# Patient Record
Sex: MALE | Race: WHITE | Employment: OTHER | URBAN - METROPOLITAN AREA
[De-identification: names, ages, dates, MRNs, and addresses within clinical notes are randomized per-mention and may not be internally consistent; named-entity substitution may affect disease eponyms.]

---

## 2017-02-07 ENCOUNTER — OFFICE VISIT (OUTPATIENT)
Dept: FAMILY MEDICINE CLINIC | Facility: CLINIC | Age: 67
End: 2017-02-07

## 2017-02-07 VITALS
SYSTOLIC BLOOD PRESSURE: 164 MMHG | DIASTOLIC BLOOD PRESSURE: 84 MMHG | OXYGEN SATURATION: 96 % | WEIGHT: 204 LBS | RESPIRATION RATE: 14 BRPM | HEART RATE: 78 BPM | HEIGHT: 73.5 IN | BODY MASS INDEX: 26.46 KG/M2 | TEMPERATURE: 99 F

## 2017-02-07 DIAGNOSIS — Z23 NEED FOR TDAP VACCINATION: ICD-10-CM

## 2017-02-07 DIAGNOSIS — Z23 NEED FOR SHINGLES VACCINE: ICD-10-CM

## 2017-02-07 DIAGNOSIS — Z13.29 SCREENING FOR ENDOCRINE, NUTRITIONAL, METABOLIC AND IMMUNITY DISORDER: ICD-10-CM

## 2017-02-07 DIAGNOSIS — Z13.228 SCREENING FOR ENDOCRINE, NUTRITIONAL, METABOLIC AND IMMUNITY DISORDER: ICD-10-CM

## 2017-02-07 DIAGNOSIS — I83.92 VARICOSE VEINS OF LEFT LOWER EXTREMITY: ICD-10-CM

## 2017-02-07 DIAGNOSIS — R03.0 BLOOD PRESSURE ELEVATED WITHOUT HISTORY OF HTN: ICD-10-CM

## 2017-02-07 DIAGNOSIS — Z13.21 SCREENING FOR ENDOCRINE, NUTRITIONAL, METABOLIC AND IMMUNITY DISORDER: ICD-10-CM

## 2017-02-07 DIAGNOSIS — Z00.00 MEDICARE WELCOME VISIT: Primary | ICD-10-CM

## 2017-02-07 DIAGNOSIS — E66.3 OVERWEIGHT (BMI 25.0-29.9): ICD-10-CM

## 2017-02-07 DIAGNOSIS — Z12.5 PROSTATE CANCER SCREENING: ICD-10-CM

## 2017-02-07 DIAGNOSIS — Z13.0 SCREENING FOR ENDOCRINE, NUTRITIONAL, METABOLIC AND IMMUNITY DISORDER: ICD-10-CM

## 2017-02-07 DIAGNOSIS — Z23 NEED FOR PNEUMOCOCCAL VACCINATION: ICD-10-CM

## 2017-02-07 DIAGNOSIS — M79.672 LEFT FOOT PAIN: ICD-10-CM

## 2017-02-07 DIAGNOSIS — Z12.11 SCREENING FOR COLON CANCER: ICD-10-CM

## 2017-02-07 PROCEDURE — G0009 ADMIN PNEUMOCOCCAL VACCINE: HCPCS | Performed by: EMERGENCY MEDICINE

## 2017-02-07 PROCEDURE — 90732 PPSV23 VACC 2 YRS+ SUBQ/IM: CPT | Performed by: EMERGENCY MEDICINE

## 2017-02-07 PROCEDURE — G0402 INITIAL PREVENTIVE EXAM: HCPCS | Performed by: EMERGENCY MEDICINE

## 2017-02-07 NOTE — PROGRESS NOTES
HPI:   Isaias Cheney is a 73year old male who presents for a Medicare Initial Preventative Physical Exam (Welcome to Medicare- < 12 months on Medicare).       His last annual assessment has been over 1 year: Annual Physical due on 06/02/1952    NO m SYSTEMS:   GENERAL: feels well otherwise  SKIN: denies any unusual skin lesions  EYES: denies blurred vision or double vision  HEENT: denies nasal congestion, sinus pain or ST  LUNGS: denies shortness of breath with exertion  CARDIOVASCULAR: denies chest p deformity   Heart:  Regular rate and rhythm, S1, S2 normal, no murmur, rub or gallop   Abdomen:   Soft, non-tender, bowel sounds active all four quadrants,  no masses, no organomegaly   Genitalia: Normal male   Rectal: Normal tone, normal prostate, no mass C10.29/J33)  -     CBC W Differential W Platelet [E]; Future  -     Comp Metabolic Panel (14) [E]; Future  -     Lipid Panel [E]; Future  -     TSH W Reflex To Free T4 [E]; Future  -     Hemoglobin A1C [E];  Future    Overweight (BMI 25.0-29.9)  -     Pneum help    Driving: Able without help    Preparing your meals: Able without help    Managing money/bills: Able without help    Taking medications as prescribed: Able without help    Are you able to afford your medications?: Yes (No meds)    Hearing Problems?: data found.     Fasting Blood Sugar (FSB)Annually        Cardiovascular Disease Screening     LDL Annually      EKG - w/ Initial Preventative Physical Exam only, or if medically necessary Electrocardiogram date    Colorectal Cancer Screening      Colonoscop

## 2017-02-07 NOTE — PATIENT INSTRUCTIONS
Thank you for choosing [facility]  To Do:  FOR ELISABET Mitchell  1. Arrange for colonoscopy, Dr Linwood Ma or [place]  2. COnsult Dr. Nikki Jorge regarding varicose veins  3. Have blood tests done fasting  4.  Use arch supports to footwear, avoid flat advises; talk with your healthcare provider about which tests are best for you   Depression All men in this age group At routine exams   Type 2 diabetes or prediabetes All adults beginning at age 39 and adults without symptoms at any age who are overweight second dose and at least 4 months after the first dose   Haemophilus influenzae Type B (HIB) Men at increased risk for infection – talk with your healthcare provider 1 to 3 doses   Influenza (flu) All men in this age [de-identified] Once a year   Meningococcal Men

## 2017-02-08 ENCOUNTER — APPOINTMENT (OUTPATIENT)
Dept: LAB | Age: 67
End: 2017-02-08
Attending: EMERGENCY MEDICINE
Payer: MEDICARE

## 2017-02-08 ENCOUNTER — HOSPITAL ENCOUNTER (OUTPATIENT)
Dept: GENERAL RADIOLOGY | Age: 67
Discharge: HOME | End: 2017-02-08
Attending: EMERGENCY MEDICINE
Payer: MEDICARE

## 2017-02-08 ENCOUNTER — LAB ENCOUNTER (OUTPATIENT)
Dept: LAB | Age: 67
End: 2017-02-08
Attending: EMERGENCY MEDICINE
Payer: MEDICARE

## 2017-02-08 DIAGNOSIS — M79.672 LEFT FOOT PAIN: ICD-10-CM

## 2017-02-08 DIAGNOSIS — Z13.29 SCREENING FOR ENDOCRINE, NUTRITIONAL, METABOLIC AND IMMUNITY DISORDER: ICD-10-CM

## 2017-02-08 DIAGNOSIS — Z13.21 SCREENING FOR ENDOCRINE, NUTRITIONAL, METABOLIC AND IMMUNITY DISORDER: ICD-10-CM

## 2017-02-08 DIAGNOSIS — Z13.0 SCREENING FOR ENDOCRINE, NUTRITIONAL, METABOLIC AND IMMUNITY DISORDER: ICD-10-CM

## 2017-02-08 DIAGNOSIS — Z12.11 SCREENING FOR COLON CANCER: ICD-10-CM

## 2017-02-08 DIAGNOSIS — E66.3 OVERWEIGHT (BMI 25.0-29.9): ICD-10-CM

## 2017-02-08 DIAGNOSIS — Z12.5 PROSTATE CANCER SCREENING: ICD-10-CM

## 2017-02-08 DIAGNOSIS — Z13.228 SCREENING FOR ENDOCRINE, NUTRITIONAL, METABOLIC AND IMMUNITY DISORDER: ICD-10-CM

## 2017-02-08 DIAGNOSIS — R03.0 BLOOD PRESSURE ELEVATED WITHOUT HISTORY OF HTN: ICD-10-CM

## 2017-02-08 LAB
ALBUMIN SERPL-MCNC: 3.9 G/DL (ref 3.5–4.8)
ALP LIVER SERPL-CCNC: 89 U/L (ref 45–117)
ALT SERPL-CCNC: 33 U/L (ref 17–63)
AST SERPL-CCNC: 12 U/L (ref 15–41)
BASOPHILS # BLD AUTO: 0.05 X10(3) UL (ref 0–0.1)
BASOPHILS NFR BLD AUTO: 0.7 %
BILIRUB SERPL-MCNC: 0.6 MG/DL (ref 0.1–2)
BUN BLD-MCNC: 16 MG/DL (ref 8–20)
CALCIUM BLD-MCNC: 9.4 MG/DL (ref 8.3–10.3)
CHLORIDE: 106 MMOL/L (ref 101–111)
CHOLEST SMN-MCNC: 258 MG/DL (ref ?–200)
CO2: 25 MMOL/L (ref 22–32)
COMPLEXED PSA SERPL-MCNC: 0.36 NG/ML (ref 0.01–4)
CREAT BLD-MCNC: 1.24 MG/DL (ref 0.7–1.3)
EOSINOPHIL # BLD AUTO: 0.06 X10(3) UL (ref 0–0.3)
EOSINOPHIL NFR BLD AUTO: 0.9 %
ERYTHROCYTE [DISTWIDTH] IN BLOOD BY AUTOMATED COUNT: 12.3 % (ref 11.5–16)
EST. AVERAGE GLUCOSE BLD GHB EST-MCNC: 163 MG/DL (ref 68–126)
GLUCOSE BLD-MCNC: 160 MG/DL (ref 70–99)
HBA1C MFR BLD HPLC: 7.3 % (ref ?–5.7)
HCT VFR BLD AUTO: 47.9 % (ref 37–53)
HDLC SERPL-MCNC: 53 MG/DL (ref 45–?)
HDLC SERPL: 4.87 {RATIO} (ref ?–4.97)
HGB BLD-MCNC: 15.6 G/DL (ref 13–17)
IMMATURE GRANULOCYTE COUNT: 0.04 X10(3) UL (ref 0–1)
IMMATURE GRANULOCYTE RATIO %: 0.6 %
LDLC SERPL CALC-MCNC: 166 MG/DL (ref ?–130)
LYMPHOCYTES # BLD AUTO: 1.19 X10(3) UL (ref 0.9–4)
LYMPHOCYTES NFR BLD AUTO: 17.6 %
M PROTEIN MFR SERPL ELPH: 7 G/DL (ref 6.1–8.3)
MCH RBC QN AUTO: 31.8 PG (ref 27–33.2)
MCHC RBC AUTO-ENTMCNC: 32.6 G/DL (ref 31–37)
MCV RBC AUTO: 97.6 FL (ref 80–99)
MONOCYTES # BLD AUTO: 0.56 X10(3) UL (ref 0.1–0.6)
MONOCYTES NFR BLD AUTO: 8.3 %
NEUTROPHIL ABS PRELIM: 4.88 X10 (3) UL (ref 1.3–6.7)
NEUTROPHILS # BLD AUTO: 4.88 X10(3) UL (ref 1.3–6.7)
NEUTROPHILS NFR BLD AUTO: 71.9 %
NONHDLC SERPL-MCNC: 205 MG/DL (ref ?–130)
PLATELET # BLD AUTO: 171 10(3)UL (ref 150–450)
POTASSIUM SERPL-SCNC: 3.9 MMOL/L (ref 3.6–5.1)
RBC # BLD AUTO: 4.91 X10(6)UL (ref 3.8–5.8)
RED CELL DISTRIBUTION WIDTH-SD: 44.1 FL (ref 35.1–46.3)
SODIUM SERPL-SCNC: 140 MMOL/L (ref 136–144)
TRIGLYCERIDES: 197 MG/DL (ref ?–150)
TSI SER-ACNC: 1.31 MIU/ML (ref 0.35–5.5)
VLDL: 39 MG/DL (ref 5–40)
WBC # BLD AUTO: 6.8 X10(3) UL (ref 4–13)

## 2017-02-08 PROCEDURE — 93005 ELECTROCARDIOGRAM TRACING: CPT

## 2017-02-08 PROCEDURE — 73630 X-RAY EXAM OF FOOT: CPT

## 2017-02-08 PROCEDURE — 36415 COLL VENOUS BLD VENIPUNCTURE: CPT

## 2017-02-08 PROCEDURE — 84443 ASSAY THYROID STIM HORMONE: CPT

## 2017-02-08 PROCEDURE — 80061 LIPID PANEL: CPT

## 2017-02-08 PROCEDURE — 80053 COMPREHEN METABOLIC PANEL: CPT

## 2017-02-08 PROCEDURE — 93010 ELECTROCARDIOGRAM REPORT: CPT | Performed by: INTERNAL MEDICINE

## 2017-02-08 PROCEDURE — 83036 HEMOGLOBIN GLYCOSYLATED A1C: CPT

## 2017-02-08 PROCEDURE — 85025 COMPLETE CBC W/AUTO DIFF WBC: CPT

## 2017-02-09 ENCOUNTER — TELEPHONE (OUTPATIENT)
Dept: FAMILY MEDICINE CLINIC | Facility: CLINIC | Age: 67
End: 2017-02-09

## 2017-02-09 LAB
ATRIAL RATE: 67 BPM
P AXIS: 65 DEGREES
P-R INTERVAL: 146 MS
Q-T INTERVAL: 396 MS
QRS DURATION: 102 MS
QTC CALCULATION (BEZET): 418 MS
R AXIS: 46 DEGREES
T AXIS: 25 DEGREES
VENTRICULAR RATE: 67 BPM

## 2017-02-09 NOTE — TELEPHONE ENCOUNTER
----- Message from Frances Hua MD sent at 2/8/2017  4:08 PM CST -----  Pt needs OV as soon as possible to discuss abnormal labs  Pls assist in scheduling.

## 2017-02-09 NOTE — TELEPHONE ENCOUNTER
Pt notified of lab & Xray results & below orders. Appt made with Dr. Ron Montero for tomorrow to discuss labs. All questions answered, pt expresses understanding.

## 2017-02-09 NOTE — TELEPHONE ENCOUNTER
Patient has upcoming appointment on 2/21. [id number] Suzy Samayoa for patient to keep this appointment, or would you like to see him sooner to discuss labs? Please advise. Thank you!

## 2017-02-10 ENCOUNTER — OFFICE VISIT (OUTPATIENT)
Dept: FAMILY MEDICINE CLINIC | Facility: CLINIC | Age: 67
End: 2017-02-10

## 2017-02-10 VITALS
RESPIRATION RATE: 16 BRPM | SYSTOLIC BLOOD PRESSURE: 132 MMHG | WEIGHT: 206 LBS | DIASTOLIC BLOOD PRESSURE: 90 MMHG | HEART RATE: 70 BPM | OXYGEN SATURATION: 98 % | BODY MASS INDEX: 27 KG/M2 | TEMPERATURE: 98 F

## 2017-02-10 DIAGNOSIS — E11.69 TYPE 2 DIABETES MELLITUS WITH HYPERCHOLESTEROLEMIA (HCC): ICD-10-CM

## 2017-02-10 DIAGNOSIS — Z87.891 FORMER SMOKER, STOPPED SMOKING MANY YEARS AGO: ICD-10-CM

## 2017-02-10 DIAGNOSIS — E11.9 NEW ONSET TYPE 2 DIABETES MELLITUS (HCC): Primary | ICD-10-CM

## 2017-02-10 DIAGNOSIS — E78.00 TYPE 2 DIABETES MELLITUS WITH HYPERCHOLESTEROLEMIA (HCC): ICD-10-CM

## 2017-02-10 DIAGNOSIS — Z13.6 ENCOUNTER FOR ABDOMINAL AORTIC ANEURYSM SCREENING: ICD-10-CM

## 2017-02-10 PROCEDURE — 99214 OFFICE O/P EST MOD 30 MIN: CPT | Performed by: EMERGENCY MEDICINE

## 2017-02-10 RX ORDER — ATORVASTATIN CALCIUM 10 MG/1
10 TABLET, FILM COATED ORAL DAILY
Qty: 30 TABLET | Refills: 2 | Status: SHIPPED | OUTPATIENT
Start: 2017-02-10 | End: 2017-04-04

## 2017-02-10 RX ORDER — LISINOPRIL 10 MG/1
10 TABLET ORAL DAILY
Qty: 30 TABLET | Refills: 2 | Status: SHIPPED | OUTPATIENT
Start: 2017-02-10 | End: 2017-04-06

## 2017-02-10 NOTE — PATIENT INSTRUCTIONS
Thank you for choosing [facility]  To Do:  FOR ELISABET Zaragoza  1. Start Metformin, Lipitor (for Cholesterol), Lisinopril (for BP and kidney protection)  2. Daily blood glucose monitoring record and bring to net office visit  3.  Follow up wi meal.  · Grains, beans, and starchy vegetables  · Vegetables  · Fruit  · Milk or yogurt  · Meat, poultry, fish, or tofu  · Healthy fats  · Check your blood sugar levels as directed by your provider. Take any medicine as prescribed by your provider.   · Laura Garcia range. You just need to make healthy choices. Tips for restaurant meals  When you eat away from home try these tips:  · Try to schedule your dining-out meal at your normal meal time. Make a reservation if possible, so you don't have to wait to eat.  If y [facility], [address]. All rights reserved. This information is not intended as a substitute for professional medical care. Always follow your healthcare professional's instructions.         Diabetes (General Information)  Diabetes is a long medicines to keep your blood sugar under control. · Try to reach your ideal weight. You may be able to cut back on or not have to take diabetes medicine if you eat the right foods and get exercise. · Do not smoke.  Smoking worsens the effects of diabetes watch them more often. · Do not skip meals. Try to eat small meals on a regular schedule. Do this even if you do not feel like eating. · Drink water or other liquids that do not have caffeine or calories. This will keep you from getting dehydrated.  If yo changes  · Drowsiness  · Weakness  · Confusion or loss of consciousness  Call 911  Call for emergency help right away if any of these occur:  · Chest pain or shortness of breath  · Dizziness or fainting  · Weakness of an arm or leg or one side of the face toes, feet, or limbs  How to avoid complications  The serious consequences of these complications may be avoidable for most people with diabetes by managing your blood glucose, blood pressure, and cholesterol levels. This can help you feel better and stay

## 2017-02-10 NOTE — PROGRESS NOTES
Chief Complaint:   Patient presents with: Follow - Up: F/U labs    HPI:   This is a 73year old male here for follow up regarding recent abnormal laboratories. Recent abnormal labs show elevated fasting blood glucose as well as elevated hemoglobin A1c. RRW  CARDIO: RRR without murmur  EXTREMITIES: no cyanosis, clubbing or edema        Recent Results (from the past 504 hour(s))  -COMP METABOLIC PANEL (14)   Collection Time: 02/08/17  9:49 AM   Result Value Ref Range   Glucose 160 (H) 70-99 mg/dL   BUN 16 Basophil Absolute 0.05 0.00-0.10 x10(3) uL   Immature Granulocyte Absolute 0.04 0.00-1.00 x10(3) uL   Neutrophil % 71.9 %   Lymphocyte % 17.6 %   Monocyte % 8.3 %   Eosinophil % 0.9 %   Basophil % 0.7 %   Immature Granulocyte % 0.6 %   -EKG 12-LEAD   Col Oppenheimer  6. Arrange for US of abdomen to screen for aneurysm  7. Have flu shot every  Year       No Follow-up on file.

## 2017-02-12 PROBLEM — E11.69 TYPE 2 DIABETES MELLITUS WITH HYPERCHOLESTEROLEMIA (HCC): Status: ACTIVE | Noted: 2017-02-12

## 2017-02-12 PROBLEM — E78.00 TYPE 2 DIABETES MELLITUS WITH HYPERCHOLESTEROLEMIA (HCC): Status: ACTIVE | Noted: 2017-02-12

## 2017-02-15 ENCOUNTER — TELEPHONE (OUTPATIENT)
Dept: FAMILY MEDICINE CLINIC | Facility: CLINIC | Age: 67
End: 2017-02-15

## 2017-02-15 NOTE — TELEPHONE ENCOUNTER
Pt called stating that his insurance will cover contour korey machine and test strips. Ok to order? Please advise.  Thank you

## 2017-02-15 NOTE — TELEPHONE ENCOUNTER
Patient called to update us about what blood test machine his insurance will cover. He states that it is contour korey and so are the test strips.  Please call patient to discuss what the next steps are

## 2017-02-16 NOTE — TELEPHONE ENCOUNTER
Patient called again regarding the status of this request. He was checking to see if maybe he did something wrong or he missed something.  I assured him this was awaiting the Drs authorization

## 2017-02-21 ENCOUNTER — OFFICE VISIT (OUTPATIENT)
Dept: FAMILY MEDICINE CLINIC | Facility: CLINIC | Age: 67
End: 2017-02-21

## 2017-02-21 VITALS
HEART RATE: 82 BPM | TEMPERATURE: 98 F | RESPIRATION RATE: 14 BRPM | OXYGEN SATURATION: 98 % | BODY MASS INDEX: 27 KG/M2 | WEIGHT: 204 LBS | SYSTOLIC BLOOD PRESSURE: 130 MMHG | DIASTOLIC BLOOD PRESSURE: 72 MMHG

## 2017-02-21 DIAGNOSIS — E11.9 TYPE 2 DIABETES MELLITUS WITHOUT COMPLICATION, WITHOUT LONG-TERM CURRENT USE OF INSULIN (HCC): ICD-10-CM

## 2017-02-21 DIAGNOSIS — E11.59 HYPERTENSION ASSOCIATED WITH DIABETES (HCC): ICD-10-CM

## 2017-02-21 DIAGNOSIS — I15.2 HYPERTENSION ASSOCIATED WITH DIABETES (HCC): ICD-10-CM

## 2017-02-21 DIAGNOSIS — E11.69 TYPE 2 DIABETES MELLITUS WITH HYPERCHOLESTEROLEMIA (HCC): Primary | ICD-10-CM

## 2017-02-21 DIAGNOSIS — E78.00 TYPE 2 DIABETES MELLITUS WITH HYPERCHOLESTEROLEMIA (HCC): Primary | ICD-10-CM

## 2017-02-21 PROCEDURE — 99213 OFFICE O/P EST LOW 20 MIN: CPT | Performed by: EMERGENCY MEDICINE

## 2017-02-21 NOTE — PATIENT INSTRUCTIONS
Thank you for choosing [facility]  To Do:  FOR ELISABET Powers  1. Arrange for diabetic eye exam, Dr. Caitlin Rasmussen  2. Arrange for ultrasound of abdomen  3. Follow up in 3 months  4. Have blood tests done before office visit  5.  Continue all me high blood sugar is a problem  If high blood sugar is not controlled, blood vessels throughout the body become damaged. Prolonged high blood sugar affects organs, blood vessels, and nerves.  As a result, the risks of damage to the heart, kidneys, eyes, and remedies you take. Foot exam  A foot exam checks the condition of different parts of your foot. First, your skin and nails are examined for any signs of infection. Blood flow is checked by feeling for the pulses in each foot.  You may also have tests to st regular checkups. During office visits, take off your shoes and socks as soon as you get in the exam room. Ask your healthcare provider to examine your feet for problems.  This will make it easier to find and treat small skin irritations before they get wor prevent damage to blood vessels and nerves that can cause problems with your brain, eyes, feet, and legs. · Finances. If you manage your blood sugar, you may spend less on medical care.   2 types of exercise  Two types of exercise help your body use blood

## 2017-02-21 NOTE — PROGRESS NOTES
Chief Complaint:   Patient presents with: Follow - Up: F/u medication and BP.     HPI:   This is a 73year old male here for recheck      DM  Blood checks in the 113  Taking Metformin. Tolerateing it well, mild diarrhea that resolved  No abdominal pain. Glucose Monitoring Suppl (BARRY CONTOUR NEXT EZ) w/Device Does not apply Kit 1 Device by Other route daily. Tests once daily. Dx E11.9 Disp: 1 kit Rfl: 0   Glucose Blood (BARRY CONTOUR NEXT TEST) In Vitro Strip Tests once daily.   Dx E11.9 Disp: 100 strip Nontender Normoactive BS.   No palpable masses no guarding rigidity no rebound tenderness    ASSESSMENT AND PLAN:   Diagnoses and all orders for this visit:    Type 2 diabetes mellitus with hypercholesterolemia (HCC)   Continue Metformin 500 BID   Need Dm E

## 2017-02-22 ENCOUNTER — HOSPITAL ENCOUNTER (OUTPATIENT)
Dept: ULTRASOUND IMAGING | Age: 67
Discharge: HOME | End: 2017-02-22
Attending: EMERGENCY MEDICINE
Payer: MEDICARE

## 2017-02-22 DIAGNOSIS — Z87.891 FORMER SMOKER, STOPPED SMOKING MANY YEARS AGO: ICD-10-CM

## 2017-02-22 DIAGNOSIS — Z13.6 ENCOUNTER FOR ABDOMINAL AORTIC ANEURYSM SCREENING: ICD-10-CM

## 2017-02-22 PROCEDURE — 76706 US ABDL AORTA SCREEN AAA: CPT

## 2017-02-27 ENCOUNTER — TELEPHONE (OUTPATIENT)
Dept: FAMILY MEDICINE CLINIC | Facility: CLINIC | Age: 67
End: 2017-02-27

## 2017-02-27 NOTE — TELEPHONE ENCOUNTER
[facility] faxed DM form to complete and sign. Form was completed and provided to Dr. Warren Rivera to sign and have faxed back.

## 2017-02-28 ENCOUNTER — OFFICE VISIT (OUTPATIENT)
Dept: SURGERY | Facility: CLINIC | Age: 67
End: 2017-02-28

## 2017-02-28 VITALS — HEIGHT: 74 IN | BODY MASS INDEX: 25.15 KG/M2 | WEIGHT: 196 LBS

## 2017-02-28 DIAGNOSIS — Z12.11 ENCOUNTER FOR COLONOSCOPY DUE TO HISTORY OF COLONIC POLYP: ICD-10-CM

## 2017-02-28 DIAGNOSIS — Z86.010 ENCOUNTER FOR COLONOSCOPY DUE TO HISTORY OF COLONIC POLYP: ICD-10-CM

## 2017-02-28 DIAGNOSIS — K63.5 POLYP OF COLON, UNSPECIFIED PART OF COLON, UNSPECIFIED TYPE: Primary | ICD-10-CM

## 2017-02-28 PROCEDURE — 99203 OFFICE O/P NEW LOW 30 MIN: CPT | Performed by: SURGERY

## 2017-02-28 RX ORDER — POLYETHYLENE GLYCOL 3350, SODIUM CHLORIDE, SODIUM BICARBONATE, POTASSIUM CHLORIDE 420; 11.2; 5.72; 1.48 G/4L; G/4L; G/4L; G/4L
POWDER, FOR SOLUTION ORAL
Qty: 1 BOTTLE | Refills: 0 | Status: SHIPPED | OUTPATIENT
Start: 2017-02-28 | End: 2017-03-03

## 2017-02-28 NOTE — H&P
New Patient Visit Note       Active Problems      1. Polyp of colon, unspecified part of colon, unspecified type    2.  Encounter for colonoscopy due to history of colonic polyp        Chief Complaint   Patient presents with:  Colon Problem: New patient- re Packs/Day: 0.75  Years: 20      Smokeless Status: Never Used                        Alcohol Use: Yes           0.0 oz/week       0 Standard drinks or equivalent per week       Comment: Daily    Drug Use: No                 Current Outpatient Prescriptions: Neurological: Negative for tremors, syncope and weakness. Hematological: Negative for adenopathy. Does not bruise/bleed easily. Psychiatric/Behavioral: Negative for behavioral problems and sleep disturbance.        Physical Findings   Ht 74\"  Wt 196 but are not limited to, bleeding, infection, perforation, nondiagnostic heel, incomplete exam, missed lesions, and the possibile need for further therapeutic, diagnostic, or surgical intervention.   The patient voiced understanding, and after all questions

## 2017-04-05 ENCOUNTER — PATIENT MESSAGE (OUTPATIENT)
Dept: FAMILY MEDICINE CLINIC | Facility: CLINIC | Age: 67
End: 2017-04-05

## 2017-04-05 DIAGNOSIS — E11.9 NEW ONSET TYPE 2 DIABETES MELLITUS (HCC): Primary | ICD-10-CM

## 2017-04-05 RX ORDER — ATORVASTATIN CALCIUM 10 MG/1
TABLET, FILM COATED ORAL
Qty: 30 TABLET | Refills: 5 | Status: SHIPPED | OUTPATIENT
Start: 2017-04-05

## 2017-04-06 RX ORDER — LISINOPRIL 10 MG/1
10 TABLET ORAL DAILY
Qty: 30 TABLET | Refills: 2 | Status: SHIPPED | OUTPATIENT
Start: 2017-04-06 | End: 2017-08-03

## 2017-04-06 NOTE — TELEPHONE ENCOUNTER
From: Jayshree Escobar  To: Sandi Murrieta MD  Sent: 4/5/2017 9:50 AM CDT  Subject: Prescription Question    Good day Doctor.    We have moved to [facility].  I would appreciate it if you could approve refills of my   ME

## 2017-05-05 ENCOUNTER — PATIENT OUTREACH (OUTPATIENT)
Dept: CASE MANAGEMENT | Age: 67
End: 2017-05-05

## 2017-08-03 DIAGNOSIS — E11.9 NEW ONSET TYPE 2 DIABETES MELLITUS (HCC): ICD-10-CM

## 2017-08-03 RX ORDER — LISINOPRIL 10 MG/1
TABLET ORAL
Qty: 30 TABLET | Refills: 0 | Status: SHIPPED | OUTPATIENT
Start: 2017-08-03 | End: 2017-08-31

## 2017-08-17 RX ORDER — LANCETS
EACH MISCELLANEOUS
Qty: 100 EACH | Refills: 1 | Status: SHIPPED | OUTPATIENT
Start: 2017-08-17

## 2017-08-31 DIAGNOSIS — E11.9 NEW ONSET TYPE 2 DIABETES MELLITUS (HCC): ICD-10-CM

## 2017-08-31 NOTE — TELEPHONE ENCOUNTER
Medications failed protocol please approve or deny  LOV- 2/10/2017 follow up in 2 months  Last labs- 2/8/2017, repeat labs pending  No follow up scheduled

## 2017-09-06 ENCOUNTER — PATIENT MESSAGE (OUTPATIENT)
Dept: FAMILY MEDICINE CLINIC | Facility: CLINIC | Age: 67
End: 2017-09-06

## 2017-09-06 RX ORDER — LISINOPRIL 10 MG/1
TABLET ORAL
Qty: 30 TABLET | Refills: 0 | Status: SHIPPED | OUTPATIENT
Start: 2017-09-06

## 2017-09-07 NOTE — TELEPHONE ENCOUNTER
From: Jayshree Escobar  To: Sandi Murrieta MD  Sent: 9/6/2017 2:20 PM CDT  Subject: Other    Please see previous message indicating that Colby Duron and I have moved out of the area. Thanks.

## 2018-01-18 ENCOUNTER — MED REC SCAN ONLY (OUTPATIENT)
Dept: FAMILY MEDICINE CLINIC | Facility: CLINIC | Age: 68
End: 2018-01-18

## 2018-02-23 RX ORDER — LANCETS
EACH MISCELLANEOUS
Qty: 100 EACH | Refills: 0 | OUTPATIENT
Start: 2018-02-23

## 2018-02-23 NOTE — TELEPHONE ENCOUNTER
Medication(s) to Refill:   Pending Prescriptions Disp Refills    MICROLET LANCETS Does not apply Misc [Pharmacy Med Name: Mari Chloé LANCETS 100'S] 100 each 0     Sig: TEST ONCE DAILY      BARRY CONTOUR NEXT [address] w/Device Does not apply Kit [facility]

## 2018-10-04 DIAGNOSIS — E11.9 NEW ONSET TYPE 2 DIABETES MELLITUS (HCC): ICD-10-CM

## 2018-10-05 NOTE — TELEPHONE ENCOUNTER
Medication(s) to Refill:   Requested Prescriptions     Pending Prescriptions Disp Refills   • MetFORMIN HCl 500 MG Oral Tab 60 tablet 0     Sig: TAKE 1 TABLET(500 MG) BY MOUTH TWICE DAILY         Reason for Medication Refill being sent to Provider / Adrian Izquierdo

## (undated) NOTE — MR AVS SNAPSHOT
[address]  [address]  [place] [id number] [phone]               Thank you for choosing us for your health care visit with Angeles Catherine MD.  We are glad to serve you and happy to provide you with this summary of It will also help you reduce the health risks of diabetes. There is no one specific diet that is right for everyone with diabetes. But there are general guidelines to follow.  A registered dietitian (DAKOTA) will create a tailored diet approach that’s just righ · Stay at a healthy weight. If you need to lose weight, cut down on your portion sizes. But don’t skip meals. Exercise is an important part of any weight management program. Talk with your provider about an exercise program that’s right for you.   · For miah · Save dessert for special occasions. Then choose a small dessert or share one with a friend or family member.   Make healthy choices  Fast food  · Garden salad with light dressing on the side  · Baked potato with vegetables or herbs  · Broiled, roasted, or can cause high blood sugar. Infections can cause high blood sugar even if you are taking medicines correctly.   These things can also cause low blood sugar:  · Missing meals  · Not eating enough food  · Taking too much diabetes medicine  Diabetes can cause were told to.  You may need to call your provider right away if:  ¨ Your blood sugar is above 240 while taking your diabetes medicine  ¨ Your urine ketone levels are above normal or high  ¨ You have been vomiting more than 6 hours  ¨ You have trouble breath Check your blood sugar 15 minutes after treating yourself. If it is still below 70, take 15 to 20 more grams of fast-acting sugar. Test again in 15 minutes.  If it returns to normal (70 or above), eat a snack or meal to keep your blood sugar in a safe range can help prevent or delay complications from diabetes.   Possible complications  Complications of diabetes include:  · Eye problems, including damage to the blood vessels in the eyes (retinopathy), pressure in the eye (glaucoma), and clouding of the eye’s l [address], [place] ([address])    [address] [facility] [address]   [phone]            Feb 28, 2017  9:00 AM   Exam - New Patient with MD [address], [facility], Yue Mcgrath smoker, stopped smoking many years ago [Z87.891]           Microalb/Creat Ratio, in 3 months    Complete by:   May 11, 2017    Assoc Dx:  New onset type 2 [facility] mellitus ([facility] [E11.9], Former smoker, stopped smoking many years ago [N95.559] Women and lighter weight persons: limit to <= 1 drink* per day.               Visit [facility] online at  [email or web address]

## (undated) NOTE — MR AVS SNAPSHOT
[address]  [address]  [phone]  [phone]               Thank you for choosing us for your health care visit with Fabrice Ochoa MD.  We are glad to serve you and happy to provide you with this summary of [facility] [address]  [address] 1996, [address] [facility]   Phone:  [phone]  Fax: [phone]               Prevention Guidelines, Men Ages 72 and Older  Screening tests and vaccines are an important part of managing your heal Prostate cancer All men in this age group, talk to healthcare provider about risks and benefits of digital rectal exam (KAM) and prostate-specific antigen (PSA) screening1 At routine exams   Syphilis Men at increased risk for infection – talk with your hea Fall prevention (exercise, vitamin D supplements) All men in this age group At routine exams   Sexually transmitted infection Men at increased risk for infection – talk with your healthcare provider At routine exams   Use of daily aspirin Men ages 39 to 78 Assoc Dx:  Screening for colon cancer [Z12.11], Screening for endocrine, nutritional, metabolic and immunity disorder [Z13.29, Z13.21, Z13.228, Z13.0], Overweight (BMI 25.0-29. 9) [E66.3]           Comp Metabolic Panel (14) [E]    Complete by:   Feb 07, 201 Imaging:  XR FOOT, COMPLETE (MIN 3 VIEWS), LEFT (CPT=73630)    Instructions: To schedule an appointment for your radiology test please call Ayah Sandy [address] Scheduling at [phone].          Referral Orders      Normal Orders This Visit    SURG Educational Information     Your blood pressure indicates you may be at-risk for Hypertension. Please consider the following Lifestyle Modifications. Also, please return for a follow-up Blood Pressure Check in 1 year.      Lifestyle Modification Recommen

## (undated) NOTE — Clinical Note
06/12/2017        Jayshree Escobar  [address] [place] [id number]      Dear Astrid Alba records indicate that you have outstanding lab work and or testing that was ordered for you and has not yet been completed:      CMP in 3 months  Lipid

## (undated) NOTE — Clinical Note
2017    Patient: Roger Antoine  : 1950 Visit date: 2017    Dear  Dr. Ron Montero MD,    Thank you for referring Roger Antoine to my practice. Please find my assessment and plan below.            Assessment   Polyp of colon, un

## (undated) NOTE — MR AVS SNAPSHOT
[address]  [address]  [phone] [phone]               Thank you for choosing us for your health care visit with Chance Carty MD.  We are glad to serve you and happy to provide you with this summary of Insulin is made in the pancreas. It is released into the bloodstream in response to the presence of glucose in the blood. Think of insulin as a key. When insulin reaches a cell, it attaches to the cell wall.  This signals the cell to create an opening that your diabetes healthcare team, you can learn how to protect your feet and keep them healthy. Evaluating your feet  An evaluation helps your healthcare provider check the condition of your feet.  The evaluation includes a review of your diabetes history and Creating a foot care program  Based on the evaluation, your healthcare provider will create a foot care program for you. Your program may be as simple as starting a daily self-care routine and changing the types of shoes your wear.  It may also involve pavel professional's instructions. Diabetes: The Benefits of Exercise     Take the stairs whenever you can. Exercise can lower blood sugar, help control weight, and boost your mood.  It can also improve blood flow, lower blood pressure, help you use oxyg have before starting with an exercise program. Then aim for 40 minutes of moderate to high intensity physical activity on at least 3 to 4 days each week.   Getting activity into your day  Being more active doesn’t have to be hard work.  Try these to get mor You can access your MyChart to more actively manage your health care and view more details from this visit by going to [email or web address].   If you've recently had a stay at the Hospital you can access your discharge instructions in [address] by [place]